# Patient Record
Sex: FEMALE | Employment: PART TIME | URBAN - METROPOLITAN AREA
[De-identification: names, ages, dates, MRNs, and addresses within clinical notes are randomized per-mention and may not be internally consistent; named-entity substitution may affect disease eponyms.]

---

## 2017-12-19 ENCOUNTER — OFFICE VISIT (OUTPATIENT)
Dept: MEDICAL GROUP | Facility: MEDICAL CENTER | Age: 27
End: 2017-12-19
Attending: FAMILY MEDICINE
Payer: MEDICAID

## 2017-12-19 VITALS
RESPIRATION RATE: 16 BRPM | HEART RATE: 84 BPM | BODY MASS INDEX: 31.07 KG/M2 | SYSTOLIC BLOOD PRESSURE: 102 MMHG | DIASTOLIC BLOOD PRESSURE: 60 MMHG | WEIGHT: 182 LBS | HEIGHT: 64 IN | TEMPERATURE: 99 F | OXYGEN SATURATION: 98 %

## 2017-12-19 DIAGNOSIS — E66.9 OBESITY (BMI 30-39.9): ICD-10-CM

## 2017-12-19 DIAGNOSIS — R63.5 WEIGHT GAIN: ICD-10-CM

## 2017-12-19 DIAGNOSIS — R51.9 ACUTE NONINTRACTABLE HEADACHE, UNSPECIFIED HEADACHE TYPE: ICD-10-CM

## 2017-12-19 PROCEDURE — 99203 OFFICE O/P NEW LOW 30 MIN: CPT | Performed by: FAMILY MEDICINE

## 2017-12-19 ASSESSMENT — PATIENT HEALTH QUESTIONNAIRE - PHQ9: CLINICAL INTERPRETATION OF PHQ2 SCORE: 0

## 2017-12-19 ASSESSMENT — PAIN SCALES - GENERAL: PAINLEVEL: NO PAIN

## 2017-12-20 NOTE — PROGRESS NOTES
CC: Establish care, concerns about headaches    HPI:  New patient  Melanie presents today  to [facility], 27 years old female with no significant past medical history, most recently 1-1/2 year ago from [place], [place], works 2 jobs and goes to school, as part of her [facility] visit. Reviewed with the patient her past medical problems, past surgical history, family/social history, and discussed the following concerns. Is followed today:    Acute nonintractable headache, unspecified headache type   Patient reports having these new onset of headaches for the past 2 months, couple of times a week, usually it's sharp headache on both sides of her head average is around 5, maximum is 8/10. She has been using over-the-counter Excedrin which has been helping with her headaches, denies dizziness or nausea, vomiting, but she admits that sometimes when she has the severe headache. She feels that she has mild photosensitivity. No specific relation to noise, denies fever, denies neck pain or rigidity, patient wears eye glasses and she said the last time she still an eye doctor was around 2 years ago, reports one episode of feeling tired and headache and then she lost her consciousness that happened couple of weeks ago. Reports that she does not drink enough water. When she was questioned about that     Weight gain  Patient has gained more than 40 pounds in the past one year and she said because she stopped exercising. Denies generalized fatigue or tiredness or low energy, but she reports the headache. History of regular periods. It comes every month    Patient Active Problem List    Diagnosis Date Noted   • Weight gain 12/19/2017       No current outpatient prescriptions on file.     No current facility-administered medications for this visit.          Allergies as of 12/19/2017   • (No Known Allergies)        ROS: Denies any chest pain, Shortness of breath, Changes bowel or bladder, Lower extremity  edema.    Physical Exam:  Gen.: Well-developed, well-nourished, no apparent distress,pleasant and cooperative with the examination  Skin:  Warm and dry with good turgor. No rashes or suspicious lesions in visible areas  Eye: PERRLA, conjunctiva and sclera clear, lids normal  HEENT: Normocephalic/atraumatic, sinuses nontender with palpation, TMs clear, nares patent with pink mucosa and clear rhinorrhea, lips without lesions, oropharynx clear.  Neck: Trachea midline,no masses or adenopathy  Thyroid: normal consistency and size. No masses or nodules. Not tender with palpation.  Cor: Regular rate and rhythm without murmur, gallop or rub.  Lungs: Respirations unlabored.Clear to auscultation with equal breath sounds bilaterally. No wheezes, rhonchi.  Abdomen: Soft nontender without hepatosplenomegaly or masses appreciated, normoactive bowel sounds. No hernias.  Extremities: No cyanosis, clubbing or edema, Symmetrical without deformities or malformations. Pulses 2+ and symmetrical both upper and lower extremities  Lymphatic: No abnormal adenopathy of the neck groin or axillae.  Psych: Alert and oriented x 3.Normal affect, judgement,insight and memory.    Neurological exam: Within normal limits. No neurological deficits and cranial nerves within normal limits. Exam    Assessment and Plan.   27 y.o. female establish care    1. Acute nonintractable headache, unspecified headache type  Possibly tension headaches, or might be related to disturbance of sleep since the patient works [employer] and a daytime job. Also, will rule out anemia and other pathology. Patient advised to do blood work, encouraged healthy lifestyle, more hydration and keep water around 2-3 L per day compact and follow-up with me in 2 weeks. If she again develops an episode of loss of consciousness. Advised to report to emergency room for emergency CAT scan of the head or MRI for further evaluation. Voices understanding  - CBC WITH DIFFERENTIAL; Future  -  TSH; Future  - FREE THYROXINE; Future  - COMP METABOLIC PANEL; Future  - WESTERGREN SED RATE; Future    2. Weight gain  Counseled for obesity and weight gain. Advised exercise, weight loss and healthy lifestyle modifications      Please note that this dictation was created using voice recognition software. I have made every reasonable attempt to correct obvious errors but there may be errors of grammar and content that I may have overlooked prior to finalization of this note.

## 2017-12-20 NOTE — PATIENT INSTRUCTIONS
Patient was advised to take all meds as directed , and to follow up regularly , to bring all meds each time she is coming to see me, medication SE discussed  . RTC as needed   ER warnings reviewed

## 2018-01-09 ENCOUNTER — OFFICE VISIT (OUTPATIENT)
Dept: MEDICAL GROUP | Facility: MEDICAL CENTER | Age: 28
End: 2018-01-09
Attending: FAMILY MEDICINE
Payer: MEDICAID

## 2018-01-09 VITALS
DIASTOLIC BLOOD PRESSURE: 70 MMHG | TEMPERATURE: 96.8 F | SYSTOLIC BLOOD PRESSURE: 116 MMHG | BODY MASS INDEX: 30.39 KG/M2 | RESPIRATION RATE: 18 BRPM | HEIGHT: 64 IN | WEIGHT: 178 LBS | HEART RATE: 82 BPM | OXYGEN SATURATION: 97 %

## 2018-01-09 DIAGNOSIS — E66.9 OBESITY (BMI 30-39.9): ICD-10-CM

## 2018-01-09 DIAGNOSIS — R51.9 NONINTRACTABLE HEADACHE, UNSPECIFIED CHRONICITY PATTERN, UNSPECIFIED HEADACHE TYPE: ICD-10-CM

## 2018-01-09 PROCEDURE — 99212 OFFICE O/P EST SF 10 MIN: CPT | Performed by: FAMILY MEDICINE

## 2018-01-09 PROCEDURE — 99214 OFFICE O/P EST MOD 30 MIN: CPT | Performed by: FAMILY MEDICINE

## 2018-01-09 PROCEDURE — 99213 OFFICE O/P EST LOW 20 MIN: CPT | Performed by: FAMILY MEDICINE

## 2018-01-10 NOTE — PROGRESS NOTES
Chief Complaint:   Chief Complaint   Patient presents with   • Headache     Here to follow up       HPI:Established patient   Melanie Grier is a 27 y.o. female who presents for here for FU on headache and lab review      Nonintractable headache, unspecified chronicity pattern,  Patient was supposed to do her labs to rule out underlying cause for her headache, I don't have lab work results, she said she did it at the lab about one week ago, we called [facility] and [facility] no records were found. Patient said she will go and get them from the lab and drop him at the office  for my review, she said her headache definitely improved after she started drinking more water, and she only had it once or twice since she saw me last time. But she definitely see some improvement. She has an appointment to follow-up with eye doctor to      Obesity (BMI 30-39.9)    Has been losing weight, weight dropped from 180-178 from last visit, she said she has been exercising daily and watching her diet. No other concerns        Past medical history, family history, social history and medications reviewed and updated in the record. Today   Current medications, problem list and allergies reviewed in [facility] today   Health maintenance topics are reviewed and updated. Today     Patient Active Problem List    Diagnosis Date Noted   • Weight gain 12/19/2017   • Obesity (BMI 30-39.9) 12/19/2017     Family History   Problem Relation Age of Onset   • Other Sister      bulemia   • Cancer Maternal Grandmother      skin   • Cancer Paternal Grandmother      kidney     Social History     Social History   • Marital status: Single     Spouse name: N/A   • Number of children: N/A   • Years of education: N/A     Occupational History   • Not on file.     Social History Main Topics   • Smoking status: Never Smoker   • Smokeless tobacco: Never Used   • Alcohol use Yes      Comment: 1 time weekly   • Drug use: No   • Sexual activity: Not  "Currently     Partners: Male     Other Topics Concern   • Not on file     Social History Narrative   • No narrative on file       No current outpatient prescriptions on file.     No current facility-administered medications for this visit.           Review Of Systems  As documented in HPI above  PHYSICAL EXAMINATION:    /70   Pulse 82   Temp 36 °C (96.8 °F)   Resp 18   Ht 1.626 m (5' 4\")   Wt 80.7 kg (178 lb)   LMP 01/02/2018   SpO2 97%   Breastfeeding? No   BMI 30.55 kg/m²   Gen.: Well-developed, well-nourished, no apparent distress, pleasant and cooperative with the examination  HEENT: Normocephalic/atraumatic,         ASSESSMENT/Plan:      1. Nonintractable headache, unspecified chronicity pattern, unspecified headache type  , Improved on hydration only likely was related to chronic dehydration. Advised to continue hydration. Healthy lifestyle and stay active, lab work results are pending. Patient will drop the results and records and I will review them and get back to her. No red flags    2. Obesity (BMI 30-39.9)  . Continue healthy lifestyle modifications. Behavioral changes and healthy lifestyle.      Please note that this dictation was created using voice recognition software. I have made every reasonable attempt to correct obvious errors but there may be errors of grammar and content that I may have overlooked prior to finalization of this note.       "

## 2018-02-15 ENCOUNTER — OFFICE VISIT (OUTPATIENT)
Dept: MEDICAL GROUP | Facility: MEDICAL CENTER | Age: 28
End: 2018-02-15
Attending: FAMILY MEDICINE
Payer: MEDICAID

## 2018-02-15 VITALS
HEART RATE: 100 BPM | WEIGHT: 180 LBS | DIASTOLIC BLOOD PRESSURE: 79 MMHG | HEIGHT: 64 IN | RESPIRATION RATE: 18 BRPM | BODY MASS INDEX: 30.73 KG/M2 | SYSTOLIC BLOOD PRESSURE: 110 MMHG | TEMPERATURE: 97.1 F | OXYGEN SATURATION: 97 %

## 2018-02-15 DIAGNOSIS — R09.81 NASAL CONGESTION: ICD-10-CM

## 2018-02-15 DIAGNOSIS — R07.0 THROAT PAIN IN ADULT: ICD-10-CM

## 2018-02-15 DIAGNOSIS — R05.9 COUGH: ICD-10-CM

## 2018-02-15 LAB
INT CON NEG: NORMAL
INT CON POS: NORMAL
S PYO AG THROAT QL: NORMAL

## 2018-02-15 PROCEDURE — 99214 OFFICE O/P EST MOD 30 MIN: CPT | Performed by: FAMILY MEDICINE

## 2018-02-15 PROCEDURE — 99213 OFFICE O/P EST LOW 20 MIN: CPT | Performed by: FAMILY MEDICINE

## 2018-02-15 PROCEDURE — 87880 STREP A ASSAY W/OPTIC: CPT | Performed by: FAMILY MEDICINE

## 2018-02-15 RX ORDER — IBUPROFEN 400 MG/1
400 TABLET ORAL EVERY 8 HOURS PRN
Qty: 15 TAB | Refills: 0 | Status: SHIPPED | OUTPATIENT
Start: 2018-02-15 | End: 2019-02-01

## 2018-02-15 RX ORDER — BENZONATATE 100 MG/1
100 CAPSULE ORAL 3 TIMES DAILY PRN
Qty: 30 CAP | Refills: 0 | Status: SHIPPED | OUTPATIENT
Start: 2018-02-15 | End: 2020-11-07

## 2018-02-15 NOTE — PROGRESS NOTES
Chief Complaint:   Chief Complaint   Patient presents with   • Cough   • Pharyngitis   • Runny Nose       HPI:Established patient   Melanie Grier is a 27 y.o. female who presents for a sick visit    Cough   Patient reports having these symptoms for at least one week now, started with throat pain, nasal congestion, followed by cough with some productive sputum. She is not sure of the color, subjective fever, generalized body ache and muscle pain. Denies sick contact. No chest pain or shortness of breath or palpitations     Nasal congestion  Symptoms as being going on for at least one week now, denies headache or change in the nasal secretions.      Throat pain in adult  Throat pain for the past one week, no sick contact, no difficulty swallowing, associated with subjective fever          Past medical history, family history, social history and medications reviewed and updated in the record. today  Current medications, problem list and allergies reviewed in EPIC today  Health maintenance topics are reviewed and updated.    Patient Active Problem List    Diagnosis Date Noted   • Weight gain 12/19/2017   • Obesity (BMI 30-39.9) 12/19/2017     Family History   Problem Relation Age of Onset   • Other Sister      bulemia   • Cancer Maternal Grandmother      skin   • Cancer Paternal Grandmother      kidney     Social History     Social History   • Marital status: Single     Spouse name: N/A   • Number of children: N/A   • Years of education: N/A     Occupational History   • Not on file.     Social History Main Topics   • Smoking status: Never Smoker   • Smokeless tobacco: Never Used   • Alcohol use Yes      Comment: 1 time weekly   • Drug use: No   • Sexual activity: Not Currently     Partners: Male     Other Topics Concern   • Not on file     Social History Narrative   • No narrative on file     Current Outpatient Prescriptions   Medication Sig Dispense Refill   • ibuprofen (MOTRIN) 400 MG Tab Take 1 Tab by mouth  "every 8 hours as needed. 15 Tab 0   • benzonatate (TESSALON) 100 MG Cap Take 1 Cap by mouth 3 times a day as needed for Cough. 30 Cap 0     No current facility-administered medications for this visit.            Review Of Systems  As documented in HPI above  PHYSICAL EXAMINATION:    /79   Pulse 100   Temp 36.2 °C (97.1 °F)   Resp 18   Ht 1.626 m (5' 4\")   Wt 81.6 kg (180 lb)   SpO2 97%   Breastfeeding? No   BMI 30.90 kg/m²   Gen.: Well-developed, well-nourished, no apparent distress, pleasant and cooperative with the examination  HEENT: Normocephalic/atraumatic, sinuses nontender with palpation, TMs clear, nares patent with pink mucosa and clear rhinorrhea, oropharynx clear with mild throat erythema without exudate  Neck: No JVD or bruits, no adenopathy  Cor: Regular rate and rhythm without murmur gallop or rub  Lungs: Clear to auscultation with equal breath sounds bilaterally. No wheezes, rhonchi.  Abdomen: Soft nontender without hepatosplenomegaly or masses appreciated, normoactive bowel sounds  Extremities: No cyanosis, clubbing or edema          ASSESSMENT/Plan:  1. Cough  , Likely viral symptoms, advised supportive care, push fluids, increase hydration, take it easy and rest until she feels better, symptomatic treatment for now. If not improved. Patient to RTC. No red flags    benzonatate (TESSALON) 100 MG Cap   2. Nasal congestion  benzonatate (TESSALON) 100 MG Cap   3. Throat pain in adult  ibuprofen (MOTRIN) 400 MG Tab       Please note that this dictation was created using voice recognition software. I have made every reasonable attempt to correct obvious errors but there may be errors of grammar and content that I may have overlooked prior to finalization of this note.       "

## 2018-03-16 ENCOUNTER — TELEPHONE (OUTPATIENT)
Dept: MEDICAL GROUP | Facility: MEDICAL CENTER | Age: 28
End: 2018-03-16

## 2018-03-16 NOTE — TELEPHONE ENCOUNTER
Pt called stating she need a note for school stating she missed because she was so sick the rest of February she states she is just getting better. I told her I would but the message in but I could not guarantee we would write the not for the rest of feb.

## 2018-03-20 NOTE — TELEPHONE ENCOUNTER
I can write note indicating that patient was seen at the office as indicated for sick visit evaluation. For her to submit to school. Thank you

## 2018-03-23 ENCOUNTER — OFFICE VISIT (OUTPATIENT)
Dept: MEDICAL GROUP | Facility: MEDICAL CENTER | Age: 28
End: 2018-03-23
Attending: FAMILY MEDICINE
Payer: MEDICAID

## 2018-03-23 VITALS
DIASTOLIC BLOOD PRESSURE: 68 MMHG | RESPIRATION RATE: 16 BRPM | SYSTOLIC BLOOD PRESSURE: 118 MMHG | TEMPERATURE: 98 F | HEART RATE: 84 BPM | HEIGHT: 64 IN | WEIGHT: 182 LBS | BODY MASS INDEX: 31.07 KG/M2 | OXYGEN SATURATION: 99 %

## 2018-03-23 DIAGNOSIS — R53.83 OTHER FATIGUE: Primary | ICD-10-CM

## 2018-03-23 PROCEDURE — 99213 OFFICE O/P EST LOW 20 MIN: CPT | Performed by: FAMILY MEDICINE

## 2018-03-23 ASSESSMENT — PAIN SCALES - GENERAL: PAINLEVEL: NO PAIN

## 2018-03-23 NOTE — PROGRESS NOTES
Chief Complaint:   Chief Complaint   Patient presents with   • Fatigue       HPI: Established patient  Melanie Grier is a 27 y.o. female who presents for evaluation of fatigue    Other fatigue   Patient reports today that since she had her bronchitis and flulike symptoms back in February, 1-1/2 months ago. She has been feeling low energy, fatigue, tired, and she did not go back to her usual state of health, denies fever or cough or flulike symptoms. At this time. Denies dizziness or GI symptoms, no chest pain or shortness of breath. No night sweats or unintentional weight loss. No weight gain          Past medical history, family history, social history and medications reviewed and updated in the record. Today   Current medications, problem list and allergies reviewed in EPIC today   Health maintenance topics are reviewed and updated. today    Patient Active Problem List    Diagnosis Date Noted   • Weight gain 12/19/2017   • Obesity (BMI 30-39.9) 12/19/2017     Family History   Problem Relation Age of Onset   • Other Sister      bulemia   • Cancer Maternal Grandmother      skin   • Cancer Paternal Grandmother      kidney     Social History     Social History   • Marital status: Single     Spouse name: N/A   • Number of children: N/A   • Years of education: N/A     Occupational History   • Not on file.     Social History Main Topics   • Smoking status: Never Smoker   • Smokeless tobacco: Never Used   • Alcohol use Yes      Comment: 1 time weekly   • Drug use: No   • Sexual activity: Not Currently     Partners: Male     Other Topics Concern   • Not on file     Social History Narrative   • No narrative on file     Current Outpatient Prescriptions   Medication Sig Dispense Refill   • ibuprofen (MOTRIN) 400 MG Tab Take 1 Tab by mouth every 8 hours as needed. 15 Tab 0   • benzonatate (TESSALON) 100 MG Cap Take 1 Cap by mouth 3 times a day as needed for Cough. 30 Cap 0     No current facility-administered  "medications for this visit.            Review Of Systems  As documented in HPI above  PHYSICAL EXAMINATION:    /68   Pulse 84   Temp 36.7 °C (98 °F)   Resp 16   Ht 1.626 m (5' 4.02\")   Wt 82.6 kg (182 lb)   SpO2 99%   Breastfeeding? No   BMI 31.22 kg/m²   Gen.: Well-developed, well-nourished, no apparent distress, pleasant and cooperative with the examination  HEENT: Normocephalic/atraumatic, sinuses nontender with palpation, TMs clear, nares patent with pink mucosa and clear rhinorrhea, oropharynx clear  Neck: No JVD or bruits, no adenopathy  Cor: Regular rate and rhythm without murmur gallop or rub  Lungs: Clear to auscultation with equal breath sounds bilaterally. No wheezes, rhonchi.  Abdomen: Soft nontender without hepatosplenomegaly or masses appreciated, normoactive bowel sounds  Extremities: No cyanosis, clubbing or edema          ASSESSMENT/Plan:  1. Other fatigue  Will do work up to R/O DM, Anemia , Thyroid disease , Kidney disease  . Advised importance of hydration. Healthy lifestyle and healthy diet. Follow-up for further evaluation    CBC WITH DIFFERENTIAL    COMP METABOLIC PANEL    HEMOGLOBIN A1C    TSH    FREE THYROXINE    VITAMIN D,25 HYDROXY    VITAMIN B12       Please note that this dictation was created using voice recognition software. I have made every reasonable attempt to correct obvious errors but there may be errors of grammar and content that I may have overlooked prior to finalization of this note.       "

## 2018-04-18 ENCOUNTER — HOSPITAL ENCOUNTER (OUTPATIENT)
Dept: LAB | Facility: MEDICAL CENTER | Age: 28
End: 2018-04-18
Attending: FAMILY MEDICINE
Payer: MEDICAID

## 2018-04-18 DIAGNOSIS — R53.83 OTHER FATIGUE: ICD-10-CM

## 2018-04-18 LAB
25(OH)D3 SERPL-MCNC: 15 NG/ML (ref 30–100)
ALBUMIN SERPL BCP-MCNC: 4.3 G/DL (ref 3.2–4.9)
ALBUMIN/GLOB SERPL: 1.2 G/DL
ALP SERPL-CCNC: 102 U/L (ref 30–99)
ALT SERPL-CCNC: 11 U/L (ref 2–50)
ANION GAP SERPL CALC-SCNC: 7 MMOL/L (ref 0–11.9)
AST SERPL-CCNC: 13 U/L (ref 12–45)
BASOPHILS # BLD AUTO: 0.8 % (ref 0–1.8)
BASOPHILS # BLD: 0.06 K/UL (ref 0–0.12)
BILIRUB SERPL-MCNC: 0.3 MG/DL (ref 0.1–1.5)
BUN SERPL-MCNC: 12 MG/DL (ref 8–22)
CALCIUM SERPL-MCNC: 9.4 MG/DL (ref 8.5–10.5)
CHLORIDE SERPL-SCNC: 104 MMOL/L (ref 96–112)
CO2 SERPL-SCNC: 26 MMOL/L (ref 20–33)
CREAT SERPL-MCNC: 0.76 MG/DL (ref 0.5–1.4)
EOSINOPHIL # BLD AUTO: 0.2 K/UL (ref 0–0.51)
EOSINOPHIL NFR BLD: 2.7 % (ref 0–6.9)
ERYTHROCYTE [DISTWIDTH] IN BLOOD BY AUTOMATED COUNT: 43.3 FL (ref 35.9–50)
GLOBULIN SER CALC-MCNC: 3.5 G/DL (ref 1.9–3.5)
GLUCOSE SERPL-MCNC: 94 MG/DL (ref 65–99)
HCT VFR BLD AUTO: 44.9 % (ref 37–47)
HGB BLD-MCNC: 14.1 G/DL (ref 12–16)
IMM GRANULOCYTES # BLD AUTO: 0.02 K/UL (ref 0–0.11)
IMM GRANULOCYTES NFR BLD AUTO: 0.3 % (ref 0–0.9)
LYMPHOCYTES # BLD AUTO: 2.79 K/UL (ref 1–4.8)
LYMPHOCYTES NFR BLD: 37 % (ref 22–41)
MCH RBC QN AUTO: 27.6 PG (ref 27–33)
MCHC RBC AUTO-ENTMCNC: 31.4 G/DL (ref 33.6–35)
MCV RBC AUTO: 88 FL (ref 81.4–97.8)
MONOCYTES # BLD AUTO: 0.45 K/UL (ref 0–0.85)
MONOCYTES NFR BLD AUTO: 6 % (ref 0–13.4)
NEUTROPHILS # BLD AUTO: 4.02 K/UL (ref 2–7.15)
NEUTROPHILS NFR BLD: 53.2 % (ref 44–72)
NRBC # BLD AUTO: 0 K/UL
NRBC BLD-RTO: 0 /100 WBC
PLATELET # BLD AUTO: 328 K/UL (ref 164–446)
PMV BLD AUTO: 10.4 FL (ref 9–12.9)
POTASSIUM SERPL-SCNC: 4.4 MMOL/L (ref 3.6–5.5)
PROT SERPL-MCNC: 7.8 G/DL (ref 6–8.2)
RBC # BLD AUTO: 5.1 M/UL (ref 4.2–5.4)
SODIUM SERPL-SCNC: 137 MMOL/L (ref 135–145)
T4 FREE SERPL-MCNC: 0.78 NG/DL (ref 0.53–1.43)
TSH SERPL DL<=0.005 MIU/L-ACNC: 3.16 UIU/ML (ref 0.38–5.33)
VIT B12 SERPL-MCNC: 690 PG/ML (ref 211–911)
WBC # BLD AUTO: 7.5 K/UL (ref 4.8–10.8)

## 2018-04-18 PROCEDURE — 83036 HEMOGLOBIN GLYCOSYLATED A1C: CPT

## 2018-04-18 PROCEDURE — 82306 VITAMIN D 25 HYDROXY: CPT

## 2018-04-18 PROCEDURE — 82607 VITAMIN B-12: CPT

## 2018-04-18 PROCEDURE — 85025 COMPLETE CBC W/AUTO DIFF WBC: CPT

## 2018-04-18 PROCEDURE — 84439 ASSAY OF FREE THYROXINE: CPT

## 2018-04-18 PROCEDURE — 80053 COMPREHEN METABOLIC PANEL: CPT

## 2018-04-18 PROCEDURE — 36415 COLL VENOUS BLD VENIPUNCTURE: CPT

## 2018-04-18 PROCEDURE — 84443 ASSAY THYROID STIM HORMONE: CPT

## 2018-04-19 LAB
EST. AVERAGE GLUCOSE BLD GHB EST-MCNC: 117 MG/DL
HBA1C MFR BLD: 5.7 % (ref 0–5.6)

## 2018-04-20 ENCOUNTER — OFFICE VISIT (OUTPATIENT)
Dept: MEDICAL GROUP | Facility: MEDICAL CENTER | Age: 28
End: 2018-04-20
Attending: FAMILY MEDICINE
Payer: MEDICAID

## 2018-04-20 VITALS
RESPIRATION RATE: 18 BRPM | OXYGEN SATURATION: 97 % | HEIGHT: 64 IN | DIASTOLIC BLOOD PRESSURE: 80 MMHG | HEART RATE: 100 BPM | SYSTOLIC BLOOD PRESSURE: 120 MMHG | WEIGHT: 181 LBS | BODY MASS INDEX: 30.9 KG/M2 | TEMPERATURE: 97 F

## 2018-04-20 DIAGNOSIS — R79.89 LOW VITAMIN D LEVEL: ICD-10-CM

## 2018-04-20 DIAGNOSIS — R53.83 OTHER FATIGUE: ICD-10-CM

## 2018-04-20 PROCEDURE — 99213 OFFICE O/P EST LOW 20 MIN: CPT | Performed by: FAMILY MEDICINE

## 2018-04-20 RX ORDER — ERGOCALCIFEROL 1.25 MG/1
50000 CAPSULE ORAL
Qty: 12 CAP | Refills: 0 | Status: SHIPPED | OUTPATIENT
Start: 2018-04-20

## 2018-04-20 NOTE — LETTER
April 20, 2018        Melanie Grier              To To Whom It May Concern    Patient has been following up with me in this clinic for the past 4 months since December 2017, has been experiencing fatigue and tiredness and low energy causing her to miss her classes at school. This letter was given upon patient's request      If you have any questions or concerns, please contact our clinic      Lawanda Carlin M.D.

## 2018-04-20 NOTE — PROGRESS NOTES
Chief Complaint:   Chief Complaint   Patient presents with   • Fatigue   • Results       HPI:Established patient   Melanie Grier is a 27 y.o. female who presents for follow-up on fatigue and lab work results     Other fatigue   Experiencing this fatigue and tiredness for at least 3-4 months now, not resolving, reviewed her lab work results. The only significant abnormality is low levels of vitamin D. No anemia, thyroid function within normal limits. Normal kidney and liver function test   Low vitamin D level     Level at 15, experiencing fatigability, tiredness and sometimes body aches    Past medical history, family history, social history and medications reviewed and updated in the record. Today   Current medications, problem list and allergies reviewed in EPIC today   Health maintenance topics are reviewed and updated. Today     Patient Active Problem List    Diagnosis Date Noted   • Low vitamin D level 04/20/2018   • Weight gain 12/19/2017   • Obesity (BMI 30-39.9) 12/19/2017     Family History   Problem Relation Age of Onset   • Other Sister      bulemia   • Cancer Maternal Grandmother      skin   • Cancer Paternal Grandmother      kidney     Social History     Social History   • Marital status: Single     Spouse name: N/A   • Number of children: N/A   • Years of education: N/A     Occupational History   • Not on file.     Social History Main Topics   • Smoking status: Never Smoker   • Smokeless tobacco: Never Used   • Alcohol use Yes      Comment: 1 time weekly   • Drug use: No   • Sexual activity: Not Currently     Partners: Male     Other Topics Concern   • Not on file     Social History Narrative   • No narrative on file       Current Outpatient Prescriptions   Medication Sig Dispense Refill   • ergocalciferol (DRISDOL) 54532 UNIT capsule Take 1 Cap by mouth every 7 days. 12 Cap 0   • ibuprofen (MOTRIN) 400 MG Tab Take 1 Tab by mouth every 8 hours as needed. 15 Tab 0   • benzonatate (TESSALON) 100  "MG Cap Take 1 Cap by mouth 3 times a day as needed for Cough. 30 Cap 0     No current facility-administered medications for this visit.          Review Of Systems  As documented in HPI above  PHYSICAL EXAMINATION:    /80   Pulse 100   Temp 36.1 °C (97 °F)   Resp 18   Ht 1.626 m (5' 4\")   Wt 82.1 kg (181 lb)   SpO2 97%   BMI 31.07 kg/m²   Gen.: Well-developed, well-nourished, obese, no apparent distress, pleasant and cooperative with the examination  HEENT: Normocephalic/atraumatic,         ASSESSMENT/Plan:  1. Other fatigue  , Possibly related to low vitamin D level. Encouraged healthy lifestyle changes and modifications with loss, exercise, increase hydration, take vitamin D every week for at least 3 months and we'll recheck the levels again    2. Low vitamin D level  Vitamin D 50,000 units every week for 3 months recheck levels in 3 months. Follow-up as needed and directed      Requesting letter to be submitted to school because she missed classes related to her fatigability and tiredness    Please note that this dictation was created using voice recognition software. I have worked with consultants from the vendor as well as technical experts from [employer] to optimize the interface. I have made every reasonable attempt to correct obvious errors, but I expect that there are errors of grammar and possibly content that I did not discover before finalizing the note.         "

## 2018-12-21 ENCOUNTER — TELEPHONE (OUTPATIENT)
Dept: MEDICAL GROUP | Facility: MEDICAL CENTER | Age: 28
End: 2018-12-21

## 2018-12-21 NOTE — TELEPHONE ENCOUNTER
Left message with patient about no show to appointment yesterday 12/20/18.  Explained that this was her first no show and the no show policy.

## 2019-02-01 ENCOUNTER — HOSPITAL ENCOUNTER (OUTPATIENT)
Facility: MEDICAL CENTER | Age: 29
End: 2019-02-01
Attending: FAMILY MEDICINE
Payer: MEDICAID

## 2019-02-01 ENCOUNTER — OFFICE VISIT (OUTPATIENT)
Dept: MEDICAL GROUP | Facility: MEDICAL CENTER | Age: 29
End: 2019-02-01
Attending: FAMILY MEDICINE
Payer: MEDICAID

## 2019-02-01 VITALS
TEMPERATURE: 97.8 F | BODY MASS INDEX: 30.56 KG/M2 | WEIGHT: 179 LBS | DIASTOLIC BLOOD PRESSURE: 60 MMHG | HEIGHT: 64 IN | OXYGEN SATURATION: 95 % | SYSTOLIC BLOOD PRESSURE: 116 MMHG | HEART RATE: 110 BPM | RESPIRATION RATE: 18 BRPM

## 2019-02-01 DIAGNOSIS — R07.0 THROAT PAIN IN ADULT: ICD-10-CM

## 2019-02-01 DIAGNOSIS — R05.9 COUGH: ICD-10-CM

## 2019-02-01 LAB
INT CON NEG: NEGATIVE
INT CON POS: POSITIVE
S PYO AG THROAT QL: NEGATIVE

## 2019-02-01 PROCEDURE — 99213 OFFICE O/P EST LOW 20 MIN: CPT | Performed by: FAMILY MEDICINE

## 2019-02-01 PROCEDURE — 87070 CULTURE OTHR SPECIMN AEROBIC: CPT

## 2019-02-01 PROCEDURE — 87880 STREP A ASSAY W/OPTIC: CPT | Performed by: FAMILY MEDICINE

## 2019-02-01 PROCEDURE — 99214 OFFICE O/P EST MOD 30 MIN: CPT | Performed by: FAMILY MEDICINE

## 2019-02-01 RX ORDER — FEXOFENADINE HCL 180 MG/1
180 TABLET ORAL DAILY
Qty: 30 TAB | Refills: 0 | Status: SHIPPED | OUTPATIENT
Start: 2019-02-01 | End: 2020-11-07

## 2019-02-01 RX ORDER — GUAIFENESIN, PSEUDOEPHEDRINE HYDROCHLORIDE 600; 60 MG/1; MG/1
1 TABLET, EXTENDED RELEASE ORAL EVERY 12 HOURS
Qty: 60 TAB | Refills: 0 | Status: SHIPPED | OUTPATIENT
Start: 2019-02-01 | End: 2020-11-07

## 2019-02-01 RX ORDER — IBUPROFEN 400 MG/1
400 TABLET ORAL EVERY 8 HOURS PRN
Qty: 25 TAB | Refills: 0 | Status: SHIPPED | OUTPATIENT
Start: 2019-02-01 | End: 2020-11-07

## 2019-02-01 NOTE — LETTER
February 1, 2019        Melanie Grier          To whom it may concern    Patient was seen at our clinic today for sick visit diagnosed with viral illness, advised to take medications and treatment as directed advised to rest and stay away from public for 2-3 days.          If any questions or concerns please contact our clinic      Lawanda Carlin MD  Electronically signed

## 2019-02-01 NOTE — PROGRESS NOTES
Chief Complaint:   Chief Complaint   Patient presents with   • Cough       HPI:Established patient   Melanie Grier is a 28 y.o. female who presents for sick visit, discussed the following today    Throat pain / Cough    Reports symptoms started about 5 days ago with throat pain and discomfort associated with upper spelled tract symptoms that she describes as runny nose and congestion, cough started 2 days ago not able to eat a lot of sputum but when she spits it is productive for mildly yellowish discoloration, feeling tired, denies fever.  Denies shortness of breath or chest pain or palpitations.  Patient said she has not been drinking enough water or fluids.  Noted pulse rate at the office today on the high side no fever.  Oxygen saturation 95% on room air    Past medical history, family history, social history and medications reviewed and updated in the record.  Today  Current medications, problem list and allergies reviewed in [facility] today  Health maintenance topics are reviewed and updated.    Patient Active Problem List    Diagnosis Date Noted   • Low vitamin D level 04/20/2018   • Weight gain 12/19/2017   • Obesity (BMI 30-39.9) 12/19/2017     Family History   Problem Relation Age of Onset   • Other Sister         bulemia   • Cancer Maternal Grandmother         skin   • Cancer Paternal Grandmother         kidney     Social History     Social History   • Marital status: Single     Spouse name: N/A   • Number of children: N/A   • Years of education: N/A     Occupational History   • Not on file.     Social History Main Topics   • Smoking status: Never Smoker   • Smokeless tobacco: Never Used   • Alcohol use Yes      Comment: 1 time weekly   • Drug use: No   • Sexual activity: Not Currently     Partners: Male     Other Topics Concern   • Not on file     Social History Narrative   • No narrative on file     Current Outpatient Prescriptions   Medication Sig Dispense Refill   • pseudoephedrine-guaifenesin  "(MUCINEX D)  MG per tablet Take 1 Tab by mouth every 12 hours. 60 Tab 0   • ibuprofen (MOTRIN) 400 MG Tab Take 1 Tab by mouth every 8 hours as needed. 25 Tab 0   • ergocalciferol (DRISDOL) 06742 UNIT capsule Take 1 Cap by mouth every 7 days. 12 Cap 0   • benzonatate (TESSALON) 100 MG Cap Take 1 Cap by mouth 3 times a day as needed for Cough. 30 Cap 0     No current facility-administered medications for this visit.            Review Of Systems  As documented in HPI above  PHYSICAL EXAMINATION:    /60 (BP Location: Left arm, Patient Position: Sitting, BP Cuff Size: Adult)   Pulse (!) 110   Temp 36.6 °C (97.8 °F) (Temporal)   Resp (!) 22   Ht 1.626 m (5' 4\")   Wt 81.2 kg (179 lb)   SpO2 95%   BMI 30.73 kg/m²   Gen.: Well-developed, well-nourished, no apparent distress, pleasant and cooperative with the examination  HEENT: Normocephalic/atraumatic, sinuses nontender with palpation, TMs clear, nares patent with pink mucosa and clear rhinorrhea, oropharynx clear  Neck: No JVD or bruits, no adenopathy  Cor: Regular rate and rhythm without murmur gallop or rub  Lungs: Clear to auscultation with equal breath sounds bilaterally. No wheezes, rhonchi.    Extremities: No cyanosis, clubbing or edema          ASSESSMENT/Plan:  1. Throat pain in adult   sore throat swab at the office is negative, likely viral in origin advised supportive care hydration Motrin as needed    CULTURE THROAT    ibuprofen (MOTRIN) 400 MG Tab   2. Cough   likely related to viral illness, advised to take medication as directed, letter to submit to her job provided to patient today.  If not improved to come back as directed    pseudoephedrine-guaifenesin (MUCINEX D)  MG per tablet       Please note that this dictation was created using voice recognition software. I have made every reasonable attempt to correct obvious errors but there may be errors of grammar and content that I may have overlooked prior to finalization of this note. "

## 2019-02-04 LAB
BACTERIA SPEC RESP CULT: NORMAL
SIGNIFICANT IND 70042: NORMAL
SITE SITE: NORMAL
SOURCE SOURCE: NORMAL

## 2019-02-05 ENCOUNTER — TELEPHONE (OUTPATIENT)
Dept: MEDICAL GROUP | Facility: MEDICAL CENTER | Age: 29
End: 2019-02-05

## 2019-02-06 NOTE — TELEPHONE ENCOUNTER
Patient came to the clinic today to the  reports that she has been feeling fine all her symptoms of cough and sore throat has resolved and she would like a note to go back to work.

## 2020-02-28 ENCOUNTER — OFFICE VISIT (OUTPATIENT)
Dept: MEDICAL GROUP | Facility: MEDICAL CENTER | Age: 30
End: 2020-02-28
Attending: FAMILY MEDICINE
Payer: MEDICAID

## 2020-02-28 VITALS
DIASTOLIC BLOOD PRESSURE: 69 MMHG | HEIGHT: 64 IN | HEART RATE: 98 BPM | OXYGEN SATURATION: 96 % | BODY MASS INDEX: 33.46 KG/M2 | SYSTOLIC BLOOD PRESSURE: 114 MMHG | WEIGHT: 196 LBS | TEMPERATURE: 97.9 F | RESPIRATION RATE: 18 BRPM

## 2020-02-28 DIAGNOSIS — G44.89 OTHER HEADACHE SYNDROME: ICD-10-CM

## 2020-02-28 PROCEDURE — 99213 OFFICE O/P EST LOW 20 MIN: CPT | Performed by: FAMILY MEDICINE

## 2020-02-28 PROCEDURE — 99214 OFFICE O/P EST MOD 30 MIN: CPT | Performed by: FAMILY MEDICINE

## 2020-02-28 RX ORDER — SUMATRIPTAN 25 MG/1
25-50 TABLET, FILM COATED ORAL
Qty: 15 TAB | Refills: 3 | Status: SHIPPED | OUTPATIENT
Start: 2020-02-28 | End: 2020-04-03 | Stop reason: SDUPTHER

## 2020-02-28 ASSESSMENT — FIBROSIS 4 INDEX: FIB4 SCORE: 0.35

## 2020-02-29 NOTE — PROGRESS NOTES
Chief Complaint:   Chief Complaint   Patient presents with   • Migraine       HPI: Established patient  Melanie Grier is a 29 y.o. female who presents for      headache syndrome    Patient reports for the past 3 to 4 weeks is been experiencing acute episodes of headache that is not controlled with her regular measures by using over-the-counter medications and NSAIDs.  She reports that headache is severe to the point of affecting her daily activity and her work.  Reports no fever or nausea or vomiting, she said the headache is all over her head, with sometimes sensitivity to light and noise.  She described it as 8/10.  Not associated with vomiting.  Patient said this is a new onset of acute headache that she is concerned about.  Denies loss of consciousness    Recently had checked her vision she was thought that revision was fine and she changed her eyeglass    Past medical history, family history, social history and medications reviewed and updated in the record.  Today  Current medications, problem list and allergies reviewed in [facility] today  Health maintenance topics are reviewed and updated.    Patient Active Problem List    Diagnosis Date Noted   • Low vitamin D level 04/20/2018   • Weight gain 12/19/2017   • Obesity (BMI 30-39.9) 12/19/2017     Family History   Problem Relation Age of Onset   • Other Sister         bulemia   • Cancer Maternal Grandmother         skin   • Cancer Paternal Grandmother         kidney     Social History     Socioeconomic History   • Marital status: Single     Spouse name: Not on file   • Number of children: Not on file   • Years of education: Not on file   • Highest education level: Not on file   Occupational History   • Not on file   Social Needs   • Financial resource strain: Not on file   • Food insecurity     Worry: Not on file     Inability: Not on file   • Transportation needs     Medical: Not on file     Non-medical: Not on file   Tobacco Use   • Smoking status: Never  "Smoker   • Smokeless tobacco: Never Used   Substance and Sexual Activity   • Alcohol use: Yes     Comment: 1 time weekly   • Drug use: No   • Sexual activity: Not Currently     Partners: Male   Lifestyle   • Physical activity     Days per week: Not on file     Minutes per session: Not on file   • Stress: Not on file   Relationships   • Social connections     Talks on phone: Not on file     Gets together: Not on file     Attends Jew service: Not on file     Active member of club or organization: Not on file     Attends meetings of clubs or organizations: Not on file     Relationship status: Not on file   • Intimate partner violence     Fear of current or ex partner: Not on file     Emotionally abused: Not on file     Physically abused: Not on file     Forced sexual activity: Not on file   Other Topics Concern   • Not on file   Social History Narrative   • Not on file     Current Outpatient Medications   Medication Sig Dispense Refill   • SUMAtriptan (IMITREX) 25 MG Tab tablet Take 1-2 Tabs by mouth Once PRN for Migraine for up to 30 days. 15 Tab 3   • pseudoephedrine-guaifenesin (MUCINEX D)  MG per tablet Take 1 Tab by mouth every 12 hours. 60 Tab 0   • ibuprofen (MOTRIN) 400 MG Tab Take 1 Tab by mouth every 8 hours as needed. 25 Tab 0   • fexofenadine (ALLEGRA) 180 MG tablet Take 1 Tab by mouth every day. 30 Tab 0   • ergocalciferol (DRISDOL) 16093 UNIT capsule Take 1 Cap by mouth every 7 days. 12 Cap 0   • benzonatate (TESSALON) 100 MG Cap Take 1 Cap by mouth 3 times a day as needed for Cough. 30 Cap 0     No current facility-administered medications for this visit.            Review Of Systems  As documented in HPI above  PHYSICAL EXAMINATION:    /69 (BP Location: Left arm, Patient Position: Sitting, BP Cuff Size: Adult)   Pulse 98   Temp 36.6 °C (97.9 °F) (Temporal)   Resp 18   Ht 1.626 m (5' 4\")   Wt 88.9 kg (196 lb)   SpO2 96%   BMI 33.64 kg/m²   Gen.: Well-developed, well-nourished, no " apparent distress, pleasant and cooperative with the examination  HEENT: Normocephalic/atraumatic, sinuses nontender with palpation, TMs clear, nares patent with pink mucosa and clear rhinorrhea, oropharynx clear  Neck: No JVD or bruits, no adenopathy  Cor: Regular rate and rhythm without murmur gallop or rub  Lungs: Clear to auscultation with equal breath sounds bilaterally. No wheezes, rhonchi.  Abdomen: Soft nontender without hepatosplenomegaly or masses appreciated, normoactive bowel sounds  Extremities: No cyanosis, clubbing or edema          ASSESSMENT/Plan:  1. Other headache syndrome   acute headache for further evaluation, possibly migraine will rule out other causes.  Follow-up as directed  CT-HEAD W/O    SUMAtriptan (IMITREX) 25 MG Tab tablet    CBC WITH DIFFERENTIAL    Sed Rate    Comp Metabolic Panel       Please note that this dictation was created using voice recognition software. I have made every reasonable attempt to correct obvious errors but there may be errors of grammar and content that I may have overlooked prior to finalization of this note.

## 2020-03-04 ENCOUNTER — HOSPITAL ENCOUNTER (OUTPATIENT)
Dept: LAB | Facility: MEDICAL CENTER | Age: 30
End: 2020-03-04
Attending: FAMILY MEDICINE
Payer: MEDICAID

## 2020-03-04 DIAGNOSIS — G44.89 OTHER HEADACHE SYNDROME: ICD-10-CM

## 2020-03-04 LAB
ALBUMIN SERPL BCP-MCNC: 4 G/DL (ref 3.2–4.9)
ALBUMIN/GLOB SERPL: 1.2 G/DL
ALP SERPL-CCNC: 80 U/L (ref 30–99)
ALT SERPL-CCNC: 13 U/L (ref 2–50)
ANION GAP SERPL CALC-SCNC: 11 MMOL/L (ref 0–11.9)
AST SERPL-CCNC: 15 U/L (ref 12–45)
BASOPHILS # BLD AUTO: 0.6 % (ref 0–1.8)
BASOPHILS # BLD: 0.06 K/UL (ref 0–0.12)
BILIRUB SERPL-MCNC: 0.3 MG/DL (ref 0.1–1.5)
BUN SERPL-MCNC: 14 MG/DL (ref 8–22)
CALCIUM SERPL-MCNC: 9.1 MG/DL (ref 8.5–10.5)
CHLORIDE SERPL-SCNC: 106 MMOL/L (ref 96–112)
CO2 SERPL-SCNC: 24 MMOL/L (ref 20–33)
CREAT SERPL-MCNC: 0.94 MG/DL (ref 0.5–1.4)
EOSINOPHIL # BLD AUTO: 0.19 K/UL (ref 0–0.51)
EOSINOPHIL NFR BLD: 1.9 % (ref 0–6.9)
ERYTHROCYTE [DISTWIDTH] IN BLOOD BY AUTOMATED COUNT: 44.9 FL (ref 35.9–50)
ERYTHROCYTE [SEDIMENTATION RATE] IN BLOOD BY WESTERGREN METHOD: 16 MM/HOUR (ref 0–20)
FASTING STATUS PATIENT QL REPORTED: NORMAL
GLOBULIN SER CALC-MCNC: 3.3 G/DL (ref 1.9–3.5)
GLUCOSE SERPL-MCNC: 95 MG/DL (ref 65–99)
HCT VFR BLD AUTO: 40 % (ref 37–47)
HGB BLD-MCNC: 12.6 G/DL (ref 12–16)
IMM GRANULOCYTES # BLD AUTO: 0.04 K/UL (ref 0–0.11)
IMM GRANULOCYTES NFR BLD AUTO: 0.4 % (ref 0–0.9)
LYMPHOCYTES # BLD AUTO: 3.89 K/UL (ref 1–4.8)
LYMPHOCYTES NFR BLD: 38.1 % (ref 22–41)
MCH RBC QN AUTO: 27.9 PG (ref 27–33)
MCHC RBC AUTO-ENTMCNC: 31.5 G/DL (ref 33.6–35)
MCV RBC AUTO: 88.7 FL (ref 81.4–97.8)
MONOCYTES # BLD AUTO: 0.58 K/UL (ref 0–0.85)
MONOCYTES NFR BLD AUTO: 5.7 % (ref 0–13.4)
NEUTROPHILS # BLD AUTO: 5.45 K/UL (ref 2–7.15)
NEUTROPHILS NFR BLD: 53.3 % (ref 44–72)
NRBC # BLD AUTO: 0 K/UL
NRBC BLD-RTO: 0 /100 WBC
PLATELET # BLD AUTO: 336 K/UL (ref 164–446)
PMV BLD AUTO: 10.5 FL (ref 9–12.9)
POTASSIUM SERPL-SCNC: 3.8 MMOL/L (ref 3.6–5.5)
PROT SERPL-MCNC: 7.3 G/DL (ref 6–8.2)
RBC # BLD AUTO: 4.51 M/UL (ref 4.2–5.4)
SODIUM SERPL-SCNC: 141 MMOL/L (ref 135–145)
WBC # BLD AUTO: 10.2 K/UL (ref 4.8–10.8)

## 2020-03-04 PROCEDURE — 85652 RBC SED RATE AUTOMATED: CPT

## 2020-03-04 PROCEDURE — 85025 COMPLETE CBC W/AUTO DIFF WBC: CPT

## 2020-03-04 PROCEDURE — 80053 COMPREHEN METABOLIC PANEL: CPT

## 2020-03-04 PROCEDURE — 36415 COLL VENOUS BLD VENIPUNCTURE: CPT

## 2020-03-18 ENCOUNTER — APPOINTMENT (OUTPATIENT)
Dept: RADIOLOGY | Facility: MEDICAL CENTER | Age: 30
End: 2020-03-18
Attending: FAMILY MEDICINE
Payer: MEDICAID

## 2020-03-23 ENCOUNTER — APPOINTMENT (OUTPATIENT)
Dept: RADIOLOGY | Facility: MEDICAL CENTER | Age: 30
End: 2020-03-23
Attending: FAMILY MEDICINE
Payer: MEDICAID

## 2020-03-30 ENCOUNTER — HOSPITAL ENCOUNTER (OUTPATIENT)
Dept: RADIOLOGY | Facility: MEDICAL CENTER | Age: 30
End: 2020-03-30
Attending: FAMILY MEDICINE
Payer: MEDICAID

## 2020-03-30 DIAGNOSIS — G44.89 OTHER HEADACHE SYNDROME: ICD-10-CM

## 2020-03-30 PROCEDURE — 70450 CT HEAD/BRAIN W/O DYE: CPT

## 2020-04-03 ENCOUNTER — OFFICE VISIT (OUTPATIENT)
Dept: MEDICAL GROUP | Facility: MEDICAL CENTER | Age: 30
End: 2020-04-03
Attending: FAMILY MEDICINE
Payer: MEDICAID

## 2020-04-03 DIAGNOSIS — G44.89 OTHER HEADACHE SYNDROME: ICD-10-CM

## 2020-04-03 DIAGNOSIS — G43.809 OTHER MIGRAINE WITHOUT STATUS MIGRAINOSUS, NOT INTRACTABLE: ICD-10-CM

## 2020-04-03 PROCEDURE — 99213 OFFICE O/P EST LOW 20 MIN: CPT | Mod: CR | Performed by: FAMILY MEDICINE

## 2020-04-03 RX ORDER — TOPIRAMATE SPINKLE 25 MG/1
25 CAPSULE ORAL 2 TIMES DAILY
Qty: 60 CAP | Refills: 3 | Status: SHIPPED | OUTPATIENT
Start: 2020-04-03 | End: 2020-11-07

## 2020-04-03 RX ORDER — SUMATRIPTAN 25 MG/1
25-50 TABLET, FILM COATED ORAL
Qty: 15 TAB | Refills: 3 | Status: SHIPPED | OUTPATIENT
Start: 2020-04-03 | End: 2020-05-03

## 2020-04-03 NOTE — PROGRESS NOTES
Telephone Appointment Visit   As a means of avoiding spread of COVID-19, this visit is being conducted by telephone. This telephone visit was initiated by the patient and they verbally consented.    Time at start of call: 9:29 am    Reason for Call:  Symptom Follow-up, Headache     Patient Comments / History:        Other headache syndrome  This visit was follow-up for patient's headache, she reports that her headache is still not well controlled on NSAIDs.  Patient states she has been using ibuprofen as needed for headache.  She finished the amount of Imitrex that she was given last visit for control of her headaches as needed.  Patient reports the headache is usually worse just before her menstruation time .  Discussed with the patient her CAT scan results and labs today shows no significant findings.  Denies red flags like fatigue or loss of consciousness or other neurological signs.    Labs / Images Reviewed   Discussed with the patient  Labs : CBC , ESR , CMP . All with in Normal Limits     Ct Head on 3/30/2020 results :    1.  Head CT without contrast within normal limits. No evidence of acute cerebral infarction, hemorrhage or mass lesion.    Assessment and Plan:     1. Other migraine without status migrainosus, not intractable  Chronic problem, uncontrolled  Discussed with the patient to start her on maintenance medication Topamax 25 mg twice a day and will increase if her symptoms are not well controlled.  To continue Imitrex as directed for pain as needed.  Relayed and discussed lab work and CT scan results all within normal limits.  Follow-up in 4 weeks  - topiramate (TOPAMAX) 25 MG capsule; Take 1 Cap by mouth 2 times a day.  Dispense: 60 Cap; Refill: 3    2. Other headache syndrome  - SUMAtriptan (IMITREX) 25 MG Tab tablet; Take 1-2 Tabs by mouth Once PRN for Migraine for up to 30 days.  Dispense: 15 Tab; Refill: 3      Follow-up: No follow-ups on file.    Time at end of call: 9:37am  Total Time  Spent: 5-10 minutes    Lawanda Carlin M.D.

## 2020-11-07 ENCOUNTER — TELEMEDICINE (OUTPATIENT)
Dept: TELEHEALTH | Facility: TELEMEDICINE | Age: 30
End: 2020-11-07
Payer: MEDICAID

## 2020-11-07 VITALS — HEIGHT: 64 IN | WEIGHT: 180 LBS | BODY MASS INDEX: 30.73 KG/M2

## 2020-11-07 DIAGNOSIS — J06.9 UPPER RESPIRATORY TRACT INFECTION, UNSPECIFIED TYPE: ICD-10-CM

## 2020-11-07 DIAGNOSIS — Z20.822 EXPOSURE TO COVID-19 VIRUS: Primary | ICD-10-CM

## 2020-11-07 PROCEDURE — 99203 OFFICE O/P NEW LOW 30 MIN: CPT | Mod: CR,CS | Performed by: PHYSICIAN ASSISTANT

## 2020-11-07 RX ORDER — BENZONATATE 100 MG/1
100 CAPSULE ORAL 3 TIMES DAILY PRN
Qty: 15 CAP | Refills: 0 | Status: SHIPPED | OUTPATIENT
Start: 2020-11-07 | End: 2020-11-07

## 2020-11-07 ASSESSMENT — ENCOUNTER SYMPTOMS
VOMITING: 0
SHORTNESS OF BREATH: 0
CHILLS: 0
NAUSEA: 0
SORE THROAT: 1
DIARRHEA: 0
FEVER: 0
ABDOMINAL PAIN: 0
CONSTIPATION: 0
COUGH: 1
SINUS PAIN: 0
SPUTUM PRODUCTION: 0

## 2020-11-07 ASSESSMENT — FIBROSIS 4 INDEX: FIB4 SCORE: 0.37

## 2020-11-08 NOTE — PATIENT INSTRUCTIONS
COVID-19 results pending    *Return home and continue self-isolation until you get your test result back.  If positive: You will be called by [facility] staff.    · Stay home and continue self isolation until:  · At least ten (10) days have passed since symptoms first appeared AND  · At least 24 hours have passed from last fever without the use of fever-reducing medications AND  · Symptoms have improved (eg: cough or shortness of breath)    If negative: You will be getting a [facility] message or a letter from [facility].  · Stay home until you have no fever for 24 hours and your symptoms (cough and shortness of breath) have resolved.    You may call [facility] ER Discharge Culture Line at [phone] for results/questions.    *Even after the above are met, maintain social distance from others (at least 6 feet) and practice frequent hand washing.    *Wear a face mask in public places.      Upper Respiratory Infection, Adult  An upper respiratory infection (URI) is a common viral infection of the nose, throat, and upper air passages that lead to the lungs. The most common type of URI is the common cold. URIs usually get better on their own, without medical treatment.  What are the causes?  A URI is caused by a virus. You may catch a virus by:  · Breathing in droplets from an infected person's cough or sneeze.  · Touching something that has been exposed to the virus (contaminated) and then touching your mouth, nose, or eyes.  What increases the risk?  You are more likely to get a URI if:  · You are very young or very old.  · It is queenie or winter.  · You have close contact with others, such as at a , school, or health care facility.  · You smoke.  · You have long-term (chronic) heart or lung disease.  · You have a weakened disease-fighting (immune) system.  · You have nasal allergies or asthma.  · You are experiencing a lot of stress.  · You work in an area that has poor air circulation.  · You have poor nutrition.  What  are the signs or symptoms?  A URI usually involves some of the following symptoms:  · Runny or stuffy (congested) nose.  · Sneezing.  · Cough.  · Sore throat.  · Headache.  · Fatigue.  · Fever.  · Loss of appetite.  · Pain in your forehead, behind your eyes, and over your cheekbones (sinus pain).  · Muscle aches.  · Redness or irritation of the eyes.  · Pressure in the ears or face.  How is this diagnosed?  This condition may be diagnosed based on your medical history and symptoms, and a physical exam. Your health care provider may use a cotton swab to take a mucus sample from your nose (nasal swab). This sample can be tested to determine what virus is causing the illness.  How is this treated?  URIs usually get better on their own within 7-10 days. You can take steps at home to relieve your symptoms. Medicines cannot cure URIs, but your health care provider may recommend certain medicines to help relieve symptoms, such as:  · Over-the-counter cold medicines.  · Cough suppressants. Coughing is a type of defense against infection that helps to clear the respiratory system, so take these medicines only as recommended by your health care provider.  · Fever-reducing medicines.  Follow these instructions at home:  Activity  · Rest as needed.  · If you have a fever, stay home from work or school until your fever is gone or until your health care provider says you are no longer contagious. Your health care provider may have you wear a face mask to prevent your infection from spreading.  Relieving symptoms  · Gargle with a salt-water mixture 3-4 times a day or as needed. To make a salt-water mixture, completely dissolve ½-1 tsp of salt in 1 cup of warm water.  · Use a cool-mist humidifier to add moisture to the air. This can help you breathe more easily.  Eating and drinking    · Drink enough fluid to keep your urine pale yellow.  · Eat soups and other clear broths.  General instructions    · Take over-the-counter and  prescription medicines only as told by your health care provider. These include cold medicines, fever reducers, and cough suppressants.  · Do not use any products that contain nicotine or tobacco, such as cigarettes and e-cigarettes. If you need help quitting, ask your health care provider.  · Stay away from secondhand smoke.  · Stay up to date on all immunizations, including the yearly (annual) flu vaccine.  · Keep all follow-up visits as told by your health care provider. This is important.  How to prevent the spread of infection to others    · URIs can be passed from person to person (are contagious). To prevent the infection from spreading:  ? Wash your hands often with soap and water. If soap and water are not available, use hand .  ? Avoid touching your mouth, face, eyes, or nose.  ? Cough or sneeze into a tissue or your sleeve or elbow instead of into your hand or into the air.  Contact a health care provider if:  · You are getting worse instead of better.  · You have a fever or chills.  · Your mucus is brown or red.  · You have yellow or brown discharge coming from your nose.  · You have pain in your face, especially when you bend forward.  · You have swollen neck glands.  · You have pain while swallowing.  · You have white areas in the back of your throat.  Get help right away if:  · You have shortness of breath that gets worse.  · You have severe or persistent:  ? Headache.  ? Ear pain.  ? Sinus pain.  ? Chest pain.  · You have chronic lung disease along with any of the following:  ? Wheezing.  ? Prolonged cough.  ? Coughing up blood.  ? A change in your usual mucus.  · You have a stiff neck.  · You have changes in your:  ? Vision.  ? Hearing.  ? Thinking.  ? Mood.  Summary  · An upper respiratory infection (URI) is a common infection of the nose, throat, and upper air passages that lead to the lungs.  · A URI is caused by a virus.  · URIs usually get better on their own within 7-10  days.  · Medicines cannot cure URIs, but your health care provider may recommend certain medicines to help relieve symptoms.  This information is not intended to replace advice given to you by your health care provider. Make sure you discuss any questions you have with your health care provider.  Document Released: 06/13/2002 Document Revised: 12/26/2019 Document Reviewed: 08/03/2018  Elsevier Patient Education © 2020 [employer]

## 2020-11-08 NOTE — PROGRESS NOTES
Telemedicine Visit: Established Patient     This encounter was conducted via Zoom.   Verbal consent was obtained. Patient's identity was verified.    Subjective:   CC:   Chief Complaint   Patient presents with   • JANET Grier is a 30 y.o. female presenting for evaluation and management of:    URI   This is a new problem. Episode onset: 1 week  The problem has been unchanged. There has been no fever. Associated symptoms include congestion, coughing and a sore throat. Pertinent negatives include no abdominal pain, diarrhea, ear pain, nausea, plugged ear sensation, sinus pain, sneezing or vomiting. Treatments tried: tea      Patient's roommate has similar symptoms.  She would like Covid testing completed today.    She is concerned she has missed multiple days of school and work.  She was sick last month for 3 weeks with a stomach bug.  She was not able to follow-up with her primary care provider because she left the clinic.    Review of Systems   Constitutional: Negative for chills, fever and malaise/fatigue.   HENT: Positive for congestion and sore throat. Negative for ear pain, sinus pain and sneezing.    Respiratory: Positive for cough. Negative for sputum production and shortness of breath.    Gastrointestinal: Negative for abdominal pain, constipation, diarrhea, nausea and vomiting.   All other systems reviewed and are negative.        No Known Allergies    Current medicines (including changes today)  Current Outpatient Medications   Medication Sig Dispense Refill   • ergocalciferol (DRISDOL) 67655 UNIT capsule Take 1 Cap by mouth every 7 days. 12 Cap 0     No current facility-administered medications for this visit.        Patient Active Problem List    Diagnosis Date Noted   • Low vitamin D level 04/20/2018   • Weight gain 12/19/2017   • Obesity (BMI 30-39.9) 12/19/2017       Family History   Problem Relation Age of Onset   • Other Sister         bulemia   • Cancer Maternal Grandmother          "skin   • Cancer Paternal Grandmother         kidney       PMH: has no past medical history on file.  Surgical Hx: History reviewed. No pertinent surgical history.   Social hx:   Social History     Tobacco Use   • Smoking status: Never Smoker   • Smokeless tobacco: Never Used   Substance Use Topics   • Alcohol use: Yes     Comment: 1 time weekly   • Drug use: No               Objective:   Vitals obtained by patient:  Ht 1.626 m (5' 4\")   Wt 81.6 kg (180 lb)   BMI 30.90 kg/m²     Physical Exam:  Constitutional: Alert, no distress, well-groomed.  Skin: No rashes in visible areas.  Eye: Round. Conjunctiva clear, lids normal. No icterus.   ENMT: Lips pink without lesions, good dentition, moist mucous membranes. Phonation normal.  Neck: No masses, no thyromegaly. Moves freely without pain.  CV: Pulse as reported by patient  Respiratory: Unlabored respiratory effort, no cough or audible wheeze  Psych: Alert and oriented x3, normal affect and mood.       Assessment and Plan:   The following treatment plan was discussed:     1. Exposure to COVID-19 virus  - COVID/SARS COV-2 PCR; Future    2. Upper respiratory tract infection, unspecified type  - REFERRAL TO FOLLOW-UP WITH PRIMARY CARE    Supportive care reviewed.  Continue to monitor symptoms closely.  Start OTC decongestant, salt water gargles, benzonatate as needed.  She will be notified via MyChart on final Covid test results. The patient will quarantine until covid test results are finalized. The pt will continue to quarantine until resolution of symptoms for 24 hours without the use of antipyretics. If test results are positive the pt will also wait at least 10 days have passed since onset of symptoms per CDC guidelines.    If symptoms worsen or persist patient can return to  clinic for reevaluation.  Red flags and  emergency room precautions discussed. All side effects of medication discussed including allergic response, GI upset, tendon injury, etc. Patient " confirmed understanding of information.      Follow-up: Return if symptoms worsen or fail to improve.

## 2020-11-09 ENCOUNTER — APPOINTMENT (OUTPATIENT)
Dept: URGENT CARE | Facility: PHYSICIAN GROUP | Age: 30
End: 2020-11-09
Payer: MEDICAID

## 2020-11-16 ENCOUNTER — HOSPITAL ENCOUNTER (OUTPATIENT)
Dept: LAB | Facility: MEDICAL CENTER | Age: 30
End: 2020-11-16
Attending: PHYSICIAN ASSISTANT
Payer: MEDICAID

## 2020-11-16 DIAGNOSIS — Z20.822 EXPOSURE TO COVID-19 VIRUS: ICD-10-CM

## 2020-11-16 LAB — COVID ORDER STATUS COVID19: NORMAL

## 2020-11-16 PROCEDURE — U0003 INFECTIOUS AGENT DETECTION BY NUCLEIC ACID (DNA OR RNA); SEVERE ACUTE RESPIRATORY SYNDROME CORONAVIRUS 2 (SARS-COV-2) (CORONAVIRUS DISEASE [COVID-19]), AMPLIFIED PROBE TECHNIQUE, MAKING USE OF HIGH THROUGHPUT TECHNOLOGIES AS DESCRIBED BY CMS-2020-01-R: HCPCS

## 2020-11-16 PROCEDURE — C9803 HOPD COVID-19 SPEC COLLECT: HCPCS

## 2020-11-17 LAB
SARS-COV-2 RNA RESP QL NAA+PROBE: NOTDETECTED
SPECIMEN SOURCE: NORMAL

## 2020-12-10 ENCOUNTER — OFFICE VISIT (OUTPATIENT)
Dept: MEDICAL GROUP | Facility: MEDICAL CENTER | Age: 30
End: 2020-12-10
Attending: NURSE PRACTITIONER
Payer: MEDICAID

## 2020-12-10 ENCOUNTER — HOSPITAL ENCOUNTER (OUTPATIENT)
Dept: RADIOLOGY | Facility: MEDICAL CENTER | Age: 30
End: 2020-12-10
Attending: NURSE PRACTITIONER
Payer: MEDICAID

## 2020-12-10 VITALS
BODY MASS INDEX: 34.4 KG/M2 | RESPIRATION RATE: 16 BRPM | WEIGHT: 201.5 LBS | DIASTOLIC BLOOD PRESSURE: 68 MMHG | HEIGHT: 64 IN | TEMPERATURE: 98.1 F | SYSTOLIC BLOOD PRESSURE: 110 MMHG | HEART RATE: 85 BPM | OXYGEN SATURATION: 97 %

## 2020-12-10 DIAGNOSIS — Z13.228 SCREENING FOR METABOLIC DISORDER: ICD-10-CM

## 2020-12-10 DIAGNOSIS — Z11.3 ROUTINE SCREENING FOR STI (SEXUALLY TRANSMITTED INFECTION): ICD-10-CM

## 2020-12-10 DIAGNOSIS — Z13.0 SCREENING FOR DEFICIENCY ANEMIA: ICD-10-CM

## 2020-12-10 DIAGNOSIS — Z76.89 ENCOUNTER TO ESTABLISH CARE: ICD-10-CM

## 2020-12-10 DIAGNOSIS — M53.3 SACRAL PAIN: ICD-10-CM

## 2020-12-10 DIAGNOSIS — Z13.21 ENCOUNTER FOR VITAMIN DEFICIENCY SCREENING: ICD-10-CM

## 2020-12-10 DIAGNOSIS — Z23 NEED FOR VACCINATION: ICD-10-CM

## 2020-12-10 DIAGNOSIS — E66.9 OBESITY (BMI 30-39.9): ICD-10-CM

## 2020-12-10 PROCEDURE — 99212 OFFICE O/P EST SF 10 MIN: CPT | Performed by: NURSE PRACTITIONER

## 2020-12-10 PROCEDURE — 90686 IIV4 VACC NO PRSV 0.5 ML IM: CPT

## 2020-12-10 PROCEDURE — 90715 TDAP VACCINE 7 YRS/> IM: CPT

## 2020-12-10 PROCEDURE — 72220 X-RAY EXAM SACRUM TAILBONE: CPT

## 2020-12-10 PROCEDURE — RXMED WILLOW AMBULATORY MEDICATION CHARGE: Performed by: NURSE PRACTITIONER

## 2020-12-10 PROCEDURE — 99214 OFFICE O/P EST MOD 30 MIN: CPT | Performed by: NURSE PRACTITIONER

## 2020-12-10 RX ORDER — IBUPROFEN 800 MG/1
800 TABLET ORAL EVERY 8 HOURS PRN
Qty: 90 TAB | Refills: 11 | Status: SHIPPED | OUTPATIENT
Start: 2020-12-10

## 2020-12-10 ASSESSMENT — ENCOUNTER SYMPTOMS
COUGH: 0
CONSTIPATION: 0
WHEEZING: 0
CHILLS: 0
SHORTNESS OF BREATH: 0
BLOOD IN STOOL: 0
DIARRHEA: 0
ABDOMINAL PAIN: 0
PALPITATIONS: 0
FEVER: 0
WEIGHT LOSS: 0

## 2020-12-10 ASSESSMENT — PATIENT HEALTH QUESTIONNAIRE - PHQ9: CLINICAL INTERPRETATION OF PHQ2 SCORE: 0

## 2020-12-10 ASSESSMENT — FIBROSIS 4 INDEX: FIB4 SCORE: 0.37

## 2020-12-10 NOTE — ASSESSMENT & PLAN NOTE
Started about 2 months ago- without known injury.  She was house sitting and sitting on uncomfortable couches.  The pain gradually started, it is worsening.  She is getting sharp pain, even in her sleep.  She denies palpable abnormalities.  The pain starts with sitting.  She denies numbness and tingling.  When she is standing from sitting the pain worsens.   She has tried ibuprofen- some help.  Laying down is the most helpful.  Tailbone is not tender to palpation.

## 2020-12-10 NOTE — RESULT ENCOUNTER NOTE
Adebayo Navarro–  I got the x-ray results of your sacrum and coccyx, it was absolutely normal.  I would like to see how you do with some ibuprofen and with a doughnut cushion.  If it is not improving by next week, please send me a message and I will refer you to physical therapy.  Please let me know if you have any questions or concerns.  Frances

## 2020-12-10 NOTE — PROGRESS NOTES
No chief complaint on file.      Subjective:     HPI:   Melanie Grier is a 30 y.o. female here to establish care, tailbone,  and to discuss the evaluation and management of:      Encounter to establish care  Prior PCP: Tesfaye    Other Providers:  none    Sacral pain  Started about 2 months ago- without known injury.  She was house sitting and sitting on uncomfortable couches.  The pain gradually started, it is worsening.  She is getting sharp pain, even in her sleep.  She denies palpable abnormalities.  The pain starts with sitting.  She denies numbness and tingling.  When she is standing from sitting the pain worsens.   She has tried ibuprofen- some help.  Laying down is the most helpful.  Tailbone is not tender to palpation.            ROS  Review of Systems   Constitutional: Negative for chills, fever, malaise/fatigue and weight loss.   Respiratory: Negative for cough, shortness of breath and wheezing.    Cardiovascular: Negative for chest pain, palpitations and leg swelling.   Gastrointestinal: Negative for abdominal pain, blood in stool, constipation and diarrhea.   Musculoskeletal: Positive for joint pain.         No Known Allergies    Current medicines (including changes today)  Current Outpatient Medications   Medication Sig Dispense Refill   • ibuprofen (MOTRIN) 800 MG Tab Take 1 Tab by mouth every 8 hours as needed for Moderate Pain. 90 Tab 11   • ergocalciferol (DRISDOL) 43664 UNIT capsule Take 1 Cap by mouth every 7 days. (Patient not taking: Reported on 12/10/2020) 12 Cap 0     No current facility-administered medications for this visit.      She  has a past medical history of Depression and Migraine.  She  has no past surgical history on file.  Social History     Tobacco Use   • Smoking status: Never Smoker   • Smokeless tobacco: Never Used   Substance Use Topics   • Alcohol use: Yes     Comment: occ.   • Drug use: No       Family History   Problem Relation Age of Onset   • Other Sister         " bulemia   • Cancer Maternal Grandmother         skin   • Cancer Paternal Grandmother         kidney   • Cancer Maternal great-grandmother         breast CA   • Cancer Maternal Aunt         breast CA     Family Status   Relation Name Status   • Mo  Alive   • Fa  Alive   • Sis  Alive   • MGMo  (Not Specified)   • PGMo  (Not Specified)       Patient Active Problem List    Diagnosis Date Noted   • Encounter to establish care 12/10/2020   • Sacral pain 12/10/2020   • Low vitamin D level 04/20/2018   • Weight gain 12/19/2017   • Obesity (BMI 30-39.9) 12/19/2017          Objective:     /68 (BP Location: Left arm, Patient Position: Sitting, BP Cuff Size: Adult)   Pulse 85   Temp 36.7 °C (98.1 °F) (Temporal)   Resp 16   Ht 1.626 m (5' 4\")   Wt 91.4 kg (201 lb 8 oz)   SpO2 97%  Body mass index is 34.59 kg/m².    Physical Exam:  Physical Exam   Constitutional: She is oriented to person, place, and time and well-developed, well-nourished, and in no distress. No distress.   HENT:   Head: Normocephalic.   Right Ear: Tympanic membrane and external ear normal.   Left Ear: Tympanic membrane and external ear normal.   Eyes: Pupils are equal, round, and reactive to light. Conjunctivae and EOM are normal.   Neck: Normal range of motion. Neck supple. No tracheal deviation present.   Cardiovascular: Normal rate, regular rhythm, normal heart sounds and intact distal pulses.   Pulmonary/Chest: Effort normal and breath sounds normal.   Abdominal: Soft. Bowel sounds are normal.   Musculoskeletal: Normal range of motion.   Lymphadenopathy:        Head (right side): No preauricular adenopathy present.        Head (left side): No preauricular adenopathy present.     She has no cervical adenopathy.   Neurological: She is alert and oriented to person, place, and time. She has normal sensation, normal strength and intact cranial nerves. Gait normal.   Skin: Skin is warm and dry.   Psychiatric: Affect and judgment normal.     I have " placed the below orders and discussed them with an approved delegating provider.  The MA is performing the below orders under the direction of Dr. Kraus.       Assessment and Plan:     The following treatment plan was discussed:    1. Sacral pain  DX-SACRUM AND COCCYX 2+    ibuprofen (MOTRIN) 800 MG Tab  - Chronic problem, unstable.  NO reported trauma, however considering the length of pain and impact on her daily life I will image her.  DX ordered. Recommend a donut pillow and ibuprofen prn with food.     2. Obesity (BMI 30-39.9)  Patient identified as having weight management issue.  Appropriate orders and counseling given.   3. Need for vaccination  Influenza Vaccine Quad Injection (PF)    Tdap =>6yo IM   4. Encounter to establish care     5. Screening for metabolic disorder  HEMOGLOBIN A1C    TSH WITH REFLEX TO FT4   6. Screening for deficiency anemia  CBC WITHOUT DIFFERENTIAL   7. Encounter for vitamin deficiency screening  VITAMIN D,25 HYDROXY   8. Routine screening for STI (sexually transmitted infection)  HIV AG/AB COMBO ASSAY SCREENING    T.PALLIDUM AB EIA    Chlamydia/GC PCR Urine Or Swab       Any change or worsening of signs or symptoms, patient encouraged to follow-up or report to emergency room for further evaluation. Patient verbalizes understanding and agrees.    Follow-Up: Return for TBD by lab and imaging results or sooner as needed.      PLEASE NOTE: This dictation was created using voice recognition software. I have made every reasonable attempt to correct obvious errors, but I expect that there are errors of grammar and possibly content that I did not discover before finalizing the note.

## 2020-12-11 ENCOUNTER — PHARMACY VISIT (OUTPATIENT)
Dept: PHARMACY | Facility: MEDICAL CENTER | Age: 30
End: 2020-12-11
Payer: COMMERCIAL

## 2021-01-08 ENCOUNTER — HOSPITAL ENCOUNTER (OUTPATIENT)
Dept: LAB | Facility: MEDICAL CENTER | Age: 31
End: 2021-01-08
Attending: NURSE PRACTITIONER
Payer: MEDICAID

## 2021-01-08 DIAGNOSIS — Z13.228 SCREENING FOR METABOLIC DISORDER: ICD-10-CM

## 2021-01-08 DIAGNOSIS — Z11.3 ROUTINE SCREENING FOR STI (SEXUALLY TRANSMITTED INFECTION): ICD-10-CM

## 2021-01-08 DIAGNOSIS — Z13.21 ENCOUNTER FOR VITAMIN DEFICIENCY SCREENING: ICD-10-CM

## 2021-01-08 DIAGNOSIS — Z13.0 SCREENING FOR DEFICIENCY ANEMIA: ICD-10-CM

## 2021-01-08 LAB
25(OH)D3 SERPL-MCNC: 22 NG/ML (ref 30–100)
ERYTHROCYTE [DISTWIDTH] IN BLOOD BY AUTOMATED COUNT: 43 FL (ref 35.9–50)
HCT VFR BLD AUTO: 43.7 % (ref 37–47)
HGB BLD-MCNC: 13.8 G/DL (ref 12–16)
HIV 1+2 AB+HIV1 P24 AG SERPL QL IA: NORMAL
MCH RBC QN AUTO: 27.2 PG (ref 27–33)
MCHC RBC AUTO-ENTMCNC: 31.6 G/DL (ref 33.6–35)
MCV RBC AUTO: 86 FL (ref 81.4–97.8)
PLATELET # BLD AUTO: 293 K/UL (ref 164–446)
PMV BLD AUTO: 11.2 FL (ref 9–12.9)
RBC # BLD AUTO: 5.08 M/UL (ref 4.2–5.4)
TREPONEMA PALLIDUM IGG+IGM AB [PRESENCE] IN SERUM OR PLASMA BY IMMUNOASSAY: NORMAL
WBC # BLD AUTO: 7.7 K/UL (ref 4.8–10.8)

## 2021-01-08 PROCEDURE — 36415 COLL VENOUS BLD VENIPUNCTURE: CPT

## 2021-01-08 PROCEDURE — 87389 HIV-1 AG W/HIV-1&-2 AB AG IA: CPT

## 2021-01-08 PROCEDURE — 84443 ASSAY THYROID STIM HORMONE: CPT

## 2021-01-08 PROCEDURE — 86780 TREPONEMA PALLIDUM: CPT

## 2021-01-08 PROCEDURE — 85027 COMPLETE CBC AUTOMATED: CPT

## 2021-01-08 PROCEDURE — 83036 HEMOGLOBIN GLYCOSYLATED A1C: CPT

## 2021-01-08 PROCEDURE — 82306 VITAMIN D 25 HYDROXY: CPT

## 2021-01-09 LAB
EST. AVERAGE GLUCOSE BLD GHB EST-MCNC: 111 MG/DL
HBA1C MFR BLD: 5.5 % (ref 0–5.6)
TSH SERPL DL<=0.005 MIU/L-ACNC: 1.11 UIU/ML (ref 0.38–5.33)

## 2021-01-26 NOTE — RESULT ENCOUNTER NOTE
Adebayo Navarro–  I got your lab results.  You are negative for HIV and syphilis.  The lab told me that they had to cancel the gonorrhea and chlamydia test because you are unable to give a urine sample.  No worries we will order this again when I see you next.  You do not have diabetes.  Your thyroid function is normal.  You are not anemic.  Please let me know if you have any questions or concerns.  Frances

## 2021-06-19 PROCEDURE — RXMED WILLOW AMBULATORY MEDICATION CHARGE: Performed by: NURSE PRACTITIONER

## 2021-06-21 ENCOUNTER — PHARMACY VISIT (OUTPATIENT)
Dept: PHARMACY | Facility: MEDICAL CENTER | Age: 31
End: 2021-06-21
Payer: COMMERCIAL

## 2021-12-27 NOTE — LETTER
February 15, 2018        Re:Melanie Villatorojohn Grier      To Whom It May Concern:      Patient was seen at the clinic today for sick visit, evaluated and recommended to take it easy and rest for 1-2 days until she feels better, take medications as directed. If you have any questions or concerns, please contact our clinic.     Thank you              Lawanda Carlin M.D.    
[phone]